# Patient Record
Sex: FEMALE | Race: WHITE | NOT HISPANIC OR LATINO | Employment: OTHER | URBAN - METROPOLITAN AREA
[De-identification: names, ages, dates, MRNs, and addresses within clinical notes are randomized per-mention and may not be internally consistent; named-entity substitution may affect disease eponyms.]

---

## 2017-05-10 ENCOUNTER — APPOINTMENT (OUTPATIENT)
Dept: WOMENS IMAGING | Facility: HOSPITAL | Age: 52
End: 2017-05-10

## 2017-05-10 PROCEDURE — 77061 BREAST TOMOSYNTHESIS UNI: CPT | Performed by: RADIOLOGY

## 2017-05-10 PROCEDURE — G0279 TOMOSYNTHESIS, MAMMO: HCPCS | Performed by: RADIOLOGY

## 2017-05-10 PROCEDURE — G0206 DX MAMMO INCL CAD UNI: HCPCS | Performed by: RADIOLOGY

## 2017-05-10 PROCEDURE — 77065 DX MAMMO INCL CAD UNI: CPT | Performed by: RADIOLOGY

## 2017-12-01 ENCOUNTER — TRANSCRIBE ORDERS (OUTPATIENT)
Dept: ADMINISTRATIVE | Facility: HOSPITAL | Age: 52
End: 2017-12-01

## 2017-12-01 ENCOUNTER — HOSPITAL ENCOUNTER (OUTPATIENT)
Dept: CARDIOLOGY | Facility: HOSPITAL | Age: 52
Discharge: HOME | End: 2017-12-01
Attending: SURGERY | Admitting: SURGERY

## 2017-12-01 DIAGNOSIS — I73.9 CLAUDICATION (HCC): ICD-10-CM

## 2017-12-01 DIAGNOSIS — I73.9 PAD (PERIPHERAL ARTERY DISEASE) (HCC): Primary | ICD-10-CM

## 2017-12-01 LAB
BH CV LOWER ARTERIAL LEFT ABI RATIO: 1.02
BH CV LOWER ARTERIAL LEFT DORSALIS PEDIS SYS MAX: 116 MMHG
BH CV LOWER ARTERIAL LEFT GREAT TOE SYS MAX: 66 MMHG
BH CV LOWER ARTERIAL LEFT POST TIBIAL SYS MAX: 129 MMHG
BH CV LOWER ARTERIAL LEFT TBI RATIO: 0.52
BH CV LOWER ARTERIAL RIGHT ABI RATIO: 1.18
BH CV LOWER ARTERIAL RIGHT DORSALIS PEDIS SYS MAX: 134 MMHG
BH CV LOWER ARTERIAL RIGHT GREAT TOE SYS MAX: 72 MMHG
BH CV LOWER ARTERIAL RIGHT POST TIBIAL SYS MAX: 149 MMHG
BH CV LOWER ARTERIAL RIGHT TBI RATIO: 0.57
UPPER ARTERIAL LEFT ARM BRACHIAL SYS MAX: 125 MMHG
UPPER ARTERIAL RIGHT ARM BRACHIAL SYS MAX: 126 MMHG

## 2017-12-01 PROCEDURE — 93924 LWR XTR VASC STDY BILAT: CPT

## 2018-01-04 ENCOUNTER — APPOINTMENT (OUTPATIENT)
Dept: WOMENS IMAGING | Facility: HOSPITAL | Age: 53
End: 2018-01-04

## 2018-01-04 PROCEDURE — 77063 BREAST TOMOSYNTHESIS BI: CPT | Performed by: RADIOLOGY

## 2018-01-04 PROCEDURE — 77067 SCR MAMMO BI INCL CAD: CPT | Performed by: RADIOLOGY

## 2018-11-30 ENCOUNTER — TRANSCRIBE ORDERS (OUTPATIENT)
Dept: ADMINISTRATIVE | Facility: HOSPITAL | Age: 53
End: 2018-11-30

## 2018-11-30 ENCOUNTER — HOSPITAL ENCOUNTER (OUTPATIENT)
Dept: CARDIOLOGY | Facility: HOSPITAL | Age: 53
Discharge: HOME | End: 2018-11-30
Attending: SURGERY | Admitting: SURGERY

## 2018-11-30 DIAGNOSIS — I73.9 PAD (PERIPHERAL ARTERY DISEASE) (HCC): ICD-10-CM

## 2018-11-30 DIAGNOSIS — I73.9 PAD (PERIPHERAL ARTERY DISEASE) (HCC): Primary | ICD-10-CM

## 2018-11-30 LAB
BH CV LOWER ARTERIAL LEFT ABI RATIO: 1.05
BH CV LOWER ARTERIAL LEFT DORSALIS PEDIS SYS MAX: 137 MMHG
BH CV LOWER ARTERIAL LEFT GREAT TOE SYS MAX: 80 MMHG
BH CV LOWER ARTERIAL LEFT POST EX ABI RATIO: 0.92
BH CV LOWER ARTERIAL LEFT POST TIBIAL SYS MAX: 132 MMHG
BH CV LOWER ARTERIAL LEFT TBI RATIO: 0.62
BH CV LOWER ARTERIAL RIGHT ABI RATIO: 1.28
BH CV LOWER ARTERIAL RIGHT DORSALIS PEDIS SYS MAX: 160 MMHG
BH CV LOWER ARTERIAL RIGHT GREAT TOE SYS MAX: 102 MMHG
BH CV LOWER ARTERIAL RIGHT POST EX ABI RATIO: 1.24
BH CV LOWER ARTERIAL RIGHT POST TIBIAL SYS MAX: 166 MMHG
BH CV LOWER ARTERIAL RIGHT TBI RATIO: 0.78
UPPER ARTERIAL LEFT ARM BRACHIAL SYS MAX: 130 MMHG
UPPER ARTERIAL RIGHT ARM BRACHIAL SYS MAX: 123 MMHG

## 2018-11-30 PROCEDURE — 93924 LWR XTR VASC STDY BILAT: CPT

## 2019-04-23 ENCOUNTER — APPOINTMENT (OUTPATIENT)
Dept: WOMENS IMAGING | Facility: HOSPITAL | Age: 54
End: 2019-04-23

## 2019-04-23 PROCEDURE — 77063 BREAST TOMOSYNTHESIS BI: CPT | Performed by: RADIOLOGY

## 2019-04-23 PROCEDURE — 77067 SCR MAMMO BI INCL CAD: CPT | Performed by: RADIOLOGY

## 2019-11-15 ENCOUNTER — HOSPITAL ENCOUNTER (OUTPATIENT)
Dept: CARDIOLOGY | Facility: HOSPITAL | Age: 54
Discharge: HOME | End: 2019-11-15
Attending: SURGERY | Admitting: SURGERY

## 2019-11-15 ENCOUNTER — TRANSCRIBE ORDERS (OUTPATIENT)
Dept: ADMINISTRATIVE | Facility: HOSPITAL | Age: 54
End: 2019-11-15

## 2019-11-15 DIAGNOSIS — I73.9 PAD (PERIPHERAL ARTERY DISEASE) (HCC): Primary | ICD-10-CM

## 2019-11-15 DIAGNOSIS — I73.9 PAD (PERIPHERAL ARTERY DISEASE) (HCC): ICD-10-CM

## 2019-11-15 LAB
BH CV LOWER ARTERIAL LEFT ABI RATIO: 0.99
BH CV LOWER ARTERIAL LEFT DORSALIS PEDIS SYS MAX: 120 MMHG
BH CV LOWER ARTERIAL LEFT GREAT TOE SYS MAX: 96 MMHG
BH CV LOWER ARTERIAL LEFT POST EX ABI RATIO: 0.85
BH CV LOWER ARTERIAL LEFT POST TIBIAL SYS MAX: 137 MMHG
BH CV LOWER ARTERIAL LEFT TBI RATIO: 0.7
BH CV LOWER ARTERIAL RIGHT ABI RATIO: 1.27
BH CV LOWER ARTERIAL RIGHT DORSALIS PEDIS SYS MAX: 162 MMHG
BH CV LOWER ARTERIAL RIGHT GREAT TOE SYS MAX: 130 MMHG
BH CV LOWER ARTERIAL RIGHT POST EX ABI RATIO: 1.15
BH CV LOWER ARTERIAL RIGHT POST TIBIAL SYS MAX: 175 MMHG
BH CV LOWER ARTERIAL RIGHT TBI RATIO: 0.94
UPPER ARTERIAL LEFT ARM BRACHIAL SYS MAX: 138 MMHG
UPPER ARTERIAL RIGHT ARM BRACHIAL SYS MAX: 124 MMHG

## 2019-11-15 PROCEDURE — 93924 LWR XTR VASC STDY BILAT: CPT

## 2020-07-02 ENCOUNTER — APPOINTMENT (OUTPATIENT)
Dept: WOMENS IMAGING | Facility: HOSPITAL | Age: 55
End: 2020-07-02

## 2020-07-02 PROCEDURE — 77067 SCR MAMMO BI INCL CAD: CPT | Performed by: RADIOLOGY

## 2020-07-02 PROCEDURE — 77063 BREAST TOMOSYNTHESIS BI: CPT | Performed by: RADIOLOGY

## 2020-11-13 ENCOUNTER — HOSPITAL ENCOUNTER (OUTPATIENT)
Dept: CARDIOLOGY | Facility: HOSPITAL | Age: 55
Discharge: HOME | End: 2020-11-13
Admitting: SURGERY

## 2020-11-13 DIAGNOSIS — I73.9 PAD (PERIPHERAL ARTERY DISEASE) (HCC): ICD-10-CM

## 2020-11-13 LAB
BH CV LOWER ARTERIAL LEFT ABI RATIO: 1.08
BH CV LOWER ARTERIAL LEFT DORSALIS PEDIS SYS MAX: 141 MMHG
BH CV LOWER ARTERIAL LEFT GREAT TOE SYS MAX: 122 MMHG
BH CV LOWER ARTERIAL LEFT POST EX ABI RATIO: 0.97
BH CV LOWER ARTERIAL LEFT POST TIBIAL SYS MAX: 114 MMHG
BH CV LOWER ARTERIAL LEFT TBI RATIO: 0.93
BH CV LOWER ARTERIAL RIGHT ABI RATIO: 1.21
BH CV LOWER ARTERIAL RIGHT DORSALIS PEDIS SYS MAX: 137 MMHG
BH CV LOWER ARTERIAL RIGHT GREAT TOE SYS MAX: 102 MMHG
BH CV LOWER ARTERIAL RIGHT POST EX ABI RATIO: 1.24
BH CV LOWER ARTERIAL RIGHT POST TIBIAL SYS MAX: 159 MMHG
BH CV LOWER ARTERIAL RIGHT TBI RATIO: 0.78
UPPER ARTERIAL LEFT ARM BRACHIAL SYS MAX: 131 MMHG
UPPER ARTERIAL RIGHT ARM BRACHIAL SYS MAX: 118 MMHG

## 2020-11-13 PROCEDURE — 93924 LWR XTR VASC STDY BILAT: CPT

## 2021-03-24 ENCOUNTER — BULK ORDERING (OUTPATIENT)
Dept: CASE MANAGEMENT | Facility: OTHER | Age: 56
End: 2021-03-24

## 2021-03-24 DIAGNOSIS — Z23 IMMUNIZATION DUE: ICD-10-CM

## 2021-10-06 ENCOUNTER — APPOINTMENT (OUTPATIENT)
Dept: WOMENS IMAGING | Facility: HOSPITAL | Age: 56
End: 2021-10-06

## 2021-10-06 PROCEDURE — 77067 SCR MAMMO BI INCL CAD: CPT | Performed by: RADIOLOGY

## 2021-10-06 PROCEDURE — 77063 BREAST TOMOSYNTHESIS BI: CPT | Performed by: RADIOLOGY

## 2021-11-22 ENCOUNTER — TRANSCRIBE ORDERS (OUTPATIENT)
Dept: ADMINISTRATIVE | Facility: HOSPITAL | Age: 56
End: 2021-11-22

## 2021-11-22 DIAGNOSIS — I73.9 PAD (PERIPHERAL ARTERY DISEASE) (HCC): Primary | ICD-10-CM

## 2021-12-17 ENCOUNTER — APPOINTMENT (OUTPATIENT)
Dept: CARDIOLOGY | Facility: HOSPITAL | Age: 56
End: 2021-12-17

## 2022-01-03 ENCOUNTER — APPOINTMENT (OUTPATIENT)
Dept: CARDIOLOGY | Facility: HOSPITAL | Age: 57
End: 2022-01-03

## 2022-02-11 ENCOUNTER — HOSPITAL ENCOUNTER (OUTPATIENT)
Dept: CARDIOLOGY | Facility: HOSPITAL | Age: 57
Discharge: HOME | End: 2022-02-11
Admitting: SURGERY

## 2022-02-11 DIAGNOSIS — I73.9 PAD (PERIPHERAL ARTERY DISEASE): ICD-10-CM

## 2022-02-11 LAB
BH CV LOWER ARTERIAL LEFT ABI RATIO: 0.85
BH CV LOWER ARTERIAL LEFT DORSALIS PEDIS SYS MAX: 85 MMHG
BH CV LOWER ARTERIAL LEFT GREAT TOE SYS MAX: 69 MMHG
BH CV LOWER ARTERIAL LEFT HIGH THIGH SYS MAX: 141 MMHG
BH CV LOWER ARTERIAL LEFT LOW THIGH SYS MAX: 135 MMHG
BH CV LOWER ARTERIAL LEFT POPLITEAL SYS MAX: 112 MMHG
BH CV LOWER ARTERIAL LEFT POST EX ABI RATIO: 0.77
BH CV LOWER ARTERIAL LEFT POST TIBIAL SYS MAX: 116 MMHG
BH CV LOWER ARTERIAL LEFT TBI RATIO: 0.5
BH CV LOWER ARTERIAL RIGHT ABI RATIO: 1.07
BH CV LOWER ARTERIAL RIGHT DORSALIS PEDIS SYS MAX: 146 MMHG
BH CV LOWER ARTERIAL RIGHT GREAT TOE SYS MAX: 76 MMHG
BH CV LOWER ARTERIAL RIGHT HIGH THIGH SYS MAX: 189 MMHG
BH CV LOWER ARTERIAL RIGHT LOW THIGH SYS MAX: 164 MMHG
BH CV LOWER ARTERIAL RIGHT POPLITEAL SYS MAX: 148 MMHG
BH CV LOWER ARTERIAL RIGHT POST EX ABI RATIO: 1.26
BH CV LOWER ARTERIAL RIGHT POST TIBIAL SYS MAX: 147 MMHG
BH CV LOWER ARTERIAL RIGHT TBI RATIO: 0.55
UPPER ARTERIAL LEFT ARM BRACHIAL SYS MAX: 137 MMHG
UPPER ARTERIAL RIGHT ARM BRACHIAL SYS MAX: 131 MMHG

## 2022-02-11 PROCEDURE — 93924 LWR XTR VASC STDY BILAT: CPT

## 2024-06-27 PROBLEM — R25.2 LEG CRAMPING: Status: ACTIVE | Noted: 2024-06-27

## 2024-06-28 ENCOUNTER — OFFICE VISIT (OUTPATIENT)
Age: 59
End: 2024-06-28
Payer: COMMERCIAL

## 2024-06-28 ENCOUNTER — HOSPITAL ENCOUNTER (OUTPATIENT)
Facility: HOSPITAL | Age: 59
Discharge: HOME | End: 2024-06-28
Payer: COMMERCIAL

## 2024-06-28 VITALS
WEIGHT: 170 LBS | HEIGHT: 62 IN | BODY MASS INDEX: 31.28 KG/M2 | SYSTOLIC BLOOD PRESSURE: 124 MMHG | DIASTOLIC BLOOD PRESSURE: 72 MMHG

## 2024-06-28 DIAGNOSIS — I73.9 PAD (PERIPHERAL ARTERY DISEASE): ICD-10-CM

## 2024-06-28 DIAGNOSIS — I65.23 BILATERAL CAROTID ARTERY STENOSIS: ICD-10-CM

## 2024-06-28 DIAGNOSIS — I65.23 CAROTID STENOSIS, BILATERAL: ICD-10-CM

## 2024-06-28 DIAGNOSIS — I73.9 PERIPHERAL ARTERIAL DISEASE: ICD-10-CM

## 2024-06-28 DIAGNOSIS — I65.23 BILATERAL CAROTID ARTERY STENOSIS: Primary | ICD-10-CM

## 2024-06-28 LAB
BH CV LOWER ARTERIAL LEFT ABI RATIO: 0.8
BH CV LOWER ARTERIAL LEFT DORSALIS PEDIS SYS MAX: 80
BH CV LOWER ARTERIAL LEFT POST TIBIAL SYS MAX: 110
BH CV LOWER ARTERIAL RIGHT ABI RATIO: 1.01
BH CV LOWER ARTERIAL RIGHT DORSALIS PEDIS SYS MAX: 130
BH CV LOWER ARTERIAL RIGHT POST TIBIAL SYS MAX: 140
BH CV XLRA MEAS LEFT CAROTID BULB EDV: -24.7 CM/SEC
BH CV XLRA MEAS LEFT CAROTID BULB PSV: -66.7 CM/SEC
BH CV XLRA MEAS LEFT DIST CCA EDV: -29.2 CM/SEC
BH CV XLRA MEAS LEFT DIST CCA PSV: -74 CM/SEC
BH CV XLRA MEAS LEFT DIST ICA EDV: -40.2 CM/SEC
BH CV XLRA MEAS LEFT DIST ICA PSV: -95.9 CM/SEC
BH CV XLRA MEAS LEFT ICA/CCA RATIO: 1.35
BH CV XLRA MEAS LEFT MID CCA EDV: 31.1 CM/SEC
BH CV XLRA MEAS LEFT MID CCA PSV: 100.5 CM/SEC
BH CV XLRA MEAS LEFT MID ICA EDV: -45.7 CM/SEC
BH CV XLRA MEAS LEFT MID ICA PSV: -99.6 CM/SEC
BH CV XLRA MEAS LEFT PROX CCA EDV: 32 CM/SEC
BH CV XLRA MEAS LEFT PROX CCA PSV: 107.8 CM/SEC
BH CV XLRA MEAS LEFT PROX ECA EDV: -26.5 CM/SEC
BH CV XLRA MEAS LEFT PROX ECA PSV: -120.6 CM/SEC
BH CV XLRA MEAS LEFT PROX ICA EDV: -33.8 CM/SEC
BH CV XLRA MEAS LEFT PROX ICA PSV: -73.1 CM/SEC
BH CV XLRA MEAS LEFT PROX SCLA PSV: 134.3 CM/SEC
BH CV XLRA MEAS LEFT VERTEBRAL A EDV: 22.8 CM/SEC
BH CV XLRA MEAS LEFT VERTEBRAL A PSV: 61.2 CM/SEC
BH CV XLRA MEAS RIGHT CAROTID BULB EDV: -32.9 CM/SEC
BH CV XLRA MEAS RIGHT CAROTID BULB PSV: -76.8 CM/SEC
BH CV XLRA MEAS RIGHT DIST CCA EDV: -29.1 CM/SEC
BH CV XLRA MEAS RIGHT DIST CCA PSV: -75.5 CM/SEC
BH CV XLRA MEAS RIGHT DIST ICA EDV: -48.1 CM/SEC
BH CV XLRA MEAS RIGHT DIST ICA PSV: -122.3 CM/SEC
BH CV XLRA MEAS RIGHT ICA/CCA RATIO: 1.62
BH CV XLRA MEAS RIGHT MID CCA EDV: 30 CM/SEC
BH CV XLRA MEAS RIGHT MID CCA PSV: 99.7 CM/SEC
BH CV XLRA MEAS RIGHT MID ICA EDV: -34 CM/SEC
BH CV XLRA MEAS RIGHT MID ICA PSV: -80.1 CM/SEC
BH CV XLRA MEAS RIGHT PROX CCA EDV: 20.3 CM/SEC
BH CV XLRA MEAS RIGHT PROX CCA PSV: 93 CM/SEC
BH CV XLRA MEAS RIGHT PROX ECA EDV: -48.3 CM/SEC
BH CV XLRA MEAS RIGHT PROX ECA PSV: -158 CM/SEC
BH CV XLRA MEAS RIGHT PROX ICA EDV: -31.8 CM/SEC
BH CV XLRA MEAS RIGHT PROX ICA PSV: -83.4 CM/SEC
BH CV XLRA MEAS RIGHT PROX SCLA PSV: 288.5 CM/SEC
BH CV XLRA MEAS RIGHT VERTEBRAL A EDV: 25.2 CM/SEC
BH CV XLRA MEAS RIGHT VERTEBRAL A PSV: 53.8 CM/SEC
LEFT ARM BP: NORMAL MMHG
RIGHT ARM BP: NORMAL MMHG
UPPER ARTERIAL LEFT ARM BRACHIAL SYS MAX: NORMAL
UPPER ARTERIAL RIGHT ARM BRACHIAL SYS MAX: NORMAL

## 2024-06-28 PROCEDURE — 93922 UPR/L XTREMITY ART 2 LEVELS: CPT

## 2024-06-28 PROCEDURE — 93880 EXTRACRANIAL BILAT STUDY: CPT

## 2024-06-28 RX ORDER — CILOSTAZOL 100 MG/1
100 TABLET ORAL 2 TIMES DAILY
Qty: 180 TABLET | Refills: 3 | Status: SHIPPED | OUTPATIENT
Start: 2024-06-28

## 2024-06-28 NOTE — PROGRESS NOTES
Chief Complaint  Follow-up (1 yr follow up with ANISA and Carotid scan. )    Subjective        Emmie Camacho presents to [facility] VASCULAR SURGERY  HPI   Emmie Camacho is a 58 y.o. female that has been followed in our office for carotid artery stenosis and peripheral arterial disease. She returns today in follow up along with a carotid duplex and ABIs. She  reports she has been doing well without hospitalizations or surgeries. She denies any symptoms consistent with CVA, TIA, or amaurosis fugax.  She does feel as though her left leg claudication symptoms are worsening.  She reports she has gained 20 pounds in the last year because she quit smoking.  Her  also was battling pancreatic cancer last year and she has not been as active as she once was.    Review of Systems   Constitutional:  Negative for fever.   Eyes:  Negative for visual disturbance.   Cardiovascular:  Negative for leg swelling.   Gastrointestinal:  Negative for abdominal pain.   Musculoskeletal:  Negative for back pain.   Skin:  Negative for color change, pallor and wound.   Neurological:  Negative for dizziness, facial asymmetry, speech difficulty and weakness.        Emmie Camacho  reports that she has been smoking. She has never used smokeless tobacco..        Objective   Vital Signs:  Vitals:    06/28/24 1404   BP: 124/72      Body mass index is 31.09 kg/m².   BMI is >= 30 and <35. (Class 1 Obesity). The following options were offered after discussion;: information on healthy weight added to patient's after visit summary        Physical Exam  Vitals reviewed.   Constitutional:       Appearance: Normal appearance.   HENT:      Head: Normocephalic.   Cardiovascular:      Rate and Rhythm: Normal rate and regular rhythm.      Pulses:           Dorsalis pedis pulses are 3+ on the right side and 3+ on the left side.        Posterior tibial pulses are 3+ on the right side and 3+ on the left side.   Pulmonary:      Effort:  Pulmonary effort is normal.   Skin:     General: Skin is warm.   Neurological:      General: No focal deficit present.      Mental Status: She is alert and oriented to person, place, and time.   Psychiatric:         Mood and Affect: Mood normal.        Result Review :      Previous carotid duplex: Less than 50% stenosis    Carotid duplex from today: Duplex Carotid Ultrasound CAR (06/28/2024 14:00)     Previous ABIs: Greater than 1 on the right 0.8 on the left    ABIs today:Doppler Ankle Brachial Index Single Level CAR (06/28/2024 14:01)                    Assessment and Plan     Diagnoses and all orders for this visit:    1. Bilateral carotid artery stenosis (Primary)  -     Duplex Carotid Ultrasound CAR; Future    2. PAD (peripheral artery disease)  -     Doppler Ankle Brachial Index Single Level CAR; Future    3. Carotid stenosis, bilateral  -     Duplex Carotid Ultrasound CAR; Future    Other orders  -     cilostazol (PLETAL) 100 MG tablet; Take 1 tablet by mouth 2 (Two) Times a Day.  Dispense: 180 tablet; Refill: 3             Patient presents today for ongoing management of her  carotid artery stenosis and peripheral arterial disease. She is to continue her antiplatelet agents which are aspirin and Plavix.  She is also on Pletal. She is on a statin for cholesterol control.  We discussed adequate blood pressure control.  I congratulated her on quitting smoking.  She reports she is not walking as much as she would like, I have recommended a walking protocol.  Reviewing her medications, she is only taking 50 mg twice a day of Pletal.  I do think we can go ahead and increase this to 100 mg twice a day.  This was sent to her local pharmacy.  We discussed indications for surgical repair.  We will continue medical management at this point.  She will return in 1 year along with a repeat carotid artery duplex and ABIs.    Follow Up     Return in about 1 year (around 6/28/2025) for carotid duplex, carmen.  Patient was given  instructions and counseling regarding her condition or for health maintenance advice. Please see specific information pulled into the AVS if appropriate.     SUE Jones

## 2024-07-22 RX ORDER — CLOPIDOGREL BISULFATE 75 MG/1
75 TABLET ORAL DAILY
Qty: 30 TABLET | Refills: 8 | Status: SHIPPED | OUTPATIENT
Start: 2024-07-22

## 2024-08-16 RX ORDER — ATORVASTATIN CALCIUM 40 MG/1
40 TABLET, FILM COATED ORAL DAILY
Qty: 90 TABLET | Refills: 3 | Status: SHIPPED | OUTPATIENT
Start: 2024-08-16

## 2025-01-22 DIAGNOSIS — I65.23 BILATERAL CAROTID ARTERY STENOSIS: Primary | ICD-10-CM

## 2025-01-22 DIAGNOSIS — I73.9 PAD (PERIPHERAL ARTERY DISEASE): ICD-10-CM

## 2025-02-21 ENCOUNTER — PATIENT ROUNDING (BHMG ONLY) (OUTPATIENT)
Dept: URGENT CARE | Facility: CLINIC | Age: 60
End: 2025-02-21
Payer: COMMERCIAL

## 2025-02-21 NOTE — ED NOTES
Thank you for letting us care for you in your recent visit to our [place] urgent care center. We would love to hear about your experience with us. We hope you had a great visit.     We’re always looking for ways to make our patients’ experiences even better. Do you have any recommendations on ways we may improve?     I appreciate you taking the time to respond. Please be on the lookout for a survey about your recent visit from [employer] via text or email. We would greatly appreciate if you could fill that out and turn it back in. We want your voice to be heard and we value your feedback.   Thank you for choosing [facility] for your healthcare needs.     If you have concerns or would like to speak to me in person regarding your visit please feel free to give me a call. [phone].    Hope you get well soon and thank you.    Surinder Del Valle LPN Practice Manager

## 2025-04-24 RX ORDER — CLOPIDOGREL BISULFATE 75 MG/1
75 TABLET ORAL DAILY
Qty: 90 TABLET | Refills: 3 | Status: SHIPPED | OUTPATIENT
Start: 2025-04-24

## 2025-06-19 PROBLEM — I65.23 BILATERAL CAROTID ARTERY STENOSIS: Status: ACTIVE | Noted: 2025-06-19

## 2025-06-19 PROBLEM — I70.212 ATHEROSCLEROSIS OF NATIVE ARTERY OF LEFT LOWER EXTREMITY WITH INTERMITTENT CLAUDICATION: Status: ACTIVE | Noted: 2025-06-19

## 2025-06-19 NOTE — PROGRESS NOTES
"Chief Complaint  Carotid Artery Disease and Atherosclerosis of native arteries of extremities with inte    Subjective      HPI: Emmie Camacho is a 59 y.o. female returns for follow-up of carotid atherosclerosis and peripheral arterial disease of the lower extremities.  Having some LLE claudications, maybe worse than before.  Still able to function.  Travel to [place] and to [place] and at times was able to walk only short distances, holding up the other center party.  No interval TIA, amaurosis, or stroke since last visit.    Objective   Vital Signs:  /79   Pulse 111   Ht 157.5 cm (62\")   Wt 83.9 kg (185 lb)   BMI 33.84 kg/m²   Estimated body mass index is 33.84 kg/m² as calculated from the following:    Height as of this encounter: 157.5 cm (62\").    Weight as of this encounter: 83.9 kg (185 lb).        Emmie Camacho  reports that she has been smoking. She has been exposed to tobacco smoke. She has never used smokeless tobacco..     Exam: Feet pink, warm and well-perfused.  No calluses, fissures or ulcers.    Personal review of data: Report from [facility] include:  Bilateral carotid duplex scan 5/28/2025 demonstrates bilateral carotid atherosclerosis without stenosis.  Bilateral lower extremity arterial exam 5/28/2025 demonstrates normal right and left leg perfusion with ABIs 1.13/0.9, with normal multiphasic arterial Doppler waveforms.       Assessment and Plan     Diagnoses and all orders for this visit:    1. Atherosclerosis of native artery of left lower extremity with intermittent claudication (Primary)  -     Doppler Ankle Brachial Index Single Level CAR; Future    2. Bilateral carotid artery stenosis    Summary: 59-year-old woman with carotid atherosclerosis and peripheral arterial disease of the lower extremities.  Takes aspirin, clopidogrel, atorvastatin and cilostazol (now at 100 mg twice daily).  Said she had a very good response to cilostazol initially, but having more claudication symptoms " recently.  Having symptoms even involving the left hip.  No right leg symptoms.  Recent noninvasive testing on an outpatient imaging center that is not accredited demonstrates carotid plaque without stenosis and normal bilateral lower extremity perfusion.  Previous LLE ANISA was 0.8.  I interpret this as showing no significant progression of disease.  I offered a CT angiogram to define arterial anatomy and possible intervention, but without much enthusiasm.  We treat symptoms and not necessarily numbers, but her perfusion at rest at least is pretty good.  After discussion, she believes that her symptoms are not yet so impairing that she needs to do something about it, but instead she was simply wanting to let me know that her leg was bothering her more.  We decided that she will return again in 1 year, but she will call us sooner if she decides that she cannot wait that long.  She has mild carotid atherosclerosis without stenosis, and has had no hemispheric symptoms.  Might check it again in a few years.    Follow Up     Return in about 1 year (around 6/20/2026) for Follow-up after test.  Patient was given instructions and counseling regarding her condition or for health maintenance advice. Please see specific information pulled into the AVS if appropriate.

## 2025-06-20 ENCOUNTER — OFFICE VISIT (OUTPATIENT)
Age: 60
End: 2025-06-20
Payer: COMMERCIAL

## 2025-06-20 ENCOUNTER — TELEPHONE (OUTPATIENT)
Age: 60
End: 2025-06-20
Payer: COMMERCIAL

## 2025-06-20 VITALS
WEIGHT: 185 LBS | SYSTOLIC BLOOD PRESSURE: 154 MMHG | DIASTOLIC BLOOD PRESSURE: 79 MMHG | BODY MASS INDEX: 34.04 KG/M2 | HEART RATE: 111 BPM | HEIGHT: 62 IN

## 2025-06-20 DIAGNOSIS — I70.212 ATHEROSCLEROSIS OF NATIVE ARTERY OF LEFT LOWER EXTREMITY WITH INTERMITTENT CLAUDICATION: Primary | ICD-10-CM

## 2025-06-20 DIAGNOSIS — I65.23 BILATERAL CAROTID ARTERY STENOSIS: ICD-10-CM

## 2025-06-20 NOTE — TELEPHONE ENCOUNTER
6/20/25 - Pt requested to have ANISA scan at [facility]. Faxed over order to  [phone]. Pt will call our office to schedule follow up after scan. - la

## 2025-07-24 RX ORDER — CILOSTAZOL 100 MG/1
100 TABLET ORAL 2 TIMES DAILY
Qty: 180 TABLET | Refills: 3 | Status: SHIPPED | OUTPATIENT
Start: 2025-07-24